# Patient Record
Sex: MALE | Race: WHITE
[De-identification: names, ages, dates, MRNs, and addresses within clinical notes are randomized per-mention and may not be internally consistent; named-entity substitution may affect disease eponyms.]

---

## 2017-10-30 ENCOUNTER — HOSPITAL ENCOUNTER (OUTPATIENT)
Dept: HOSPITAL 33 - MED SURG | Age: 72
Setting detail: OBSERVATION
LOS: 1 days | Discharge: HOME | End: 2017-10-31
Attending: INTERNAL MEDICINE | Admitting: INTERNAL MEDICINE
Payer: MEDICARE

## 2017-10-30 DIAGNOSIS — J41.8: Primary | ICD-10-CM

## 2017-10-30 DIAGNOSIS — J18.9: ICD-10-CM

## 2017-10-30 DIAGNOSIS — Z79.899: ICD-10-CM

## 2017-10-30 DIAGNOSIS — Z23: ICD-10-CM

## 2017-10-30 DIAGNOSIS — Z72.0: ICD-10-CM

## 2017-10-30 LAB
ALBUMIN SERPL-MCNC: 3.7 G/DL (ref 3.4–5)
ALP SERPL-CCNC: 99 U/L (ref 46–116)
ALT SERPL-CCNC: 25 U/L (ref 12–78)
ANION GAP SERPL CALC-SCNC: 12.1 MEQ/L (ref 5–15)
AST SERPL QL: 23 U/L (ref 15–37)
BASOPHILS NFR BLD AUTO: 0.2 % (ref 0–0.4)
BILIRUB BLD-MCNC: 0.3 MG/DL (ref 0.2–1)
BUN SERPL-MCNC: 7 MG/DL (ref 9–20)
CHLORIDE SERPL-SCNC: 90 MEQ/L (ref 98–107)
CO2 SERPL-SCNC: 28.2 MEQ/L (ref 21–32)
GLUCOSE SERPL-MCNC: 120 MG/DL (ref 70–110)
MCH RBC QN AUTO: 29.6 PG (ref 26–32)
NEUTROPHILS NFR BLD AUTO: 69.5 % (ref 36–66)
PHOSPHATE SERPL-SCNC: 3.1 MG/DL (ref 2.6–4.7)
PLATELET # BLD AUTO: 206 K/MM3 (ref 150–450)
POTASSIUM SERPLBLD-SCNC: 4.2 MEQ/L (ref 3.5–5.1)
PROT SERPL-MCNC: 7.3 GM/DL (ref 6.4–8.2)
RBC # BLD AUTO: 4.01 M/MM3 (ref 4.1–5.6)
SODIUM SERPL-SCNC: 126 MEQ/L (ref 136–145)
WBC # BLD AUTO: 5.6 K/MM3 (ref 4–10.5)

## 2017-10-30 PROCEDURE — 36415 COLL VENOUS BLD VENIPUNCTURE: CPT

## 2017-10-30 PROCEDURE — 87040 BLOOD CULTURE FOR BACTERIA: CPT

## 2017-10-30 PROCEDURE — 80053 COMPREHEN METABOLIC PANEL: CPT

## 2017-10-30 PROCEDURE — 94760 N-INVAS EAR/PLS OXIMETRY 1: CPT

## 2017-10-30 PROCEDURE — 87186 SC STD MICRODIL/AGAR DIL: CPT

## 2017-10-30 PROCEDURE — 85025 COMPLETE CBC W/AUTO DIFF WBC: CPT

## 2017-10-30 PROCEDURE — 87077 CULTURE AEROBIC IDENTIFY: CPT

## 2017-10-30 PROCEDURE — 89220 SPUTUM SPECIMEN COLLECTION: CPT

## 2017-10-30 PROCEDURE — 84100 ASSAY OF PHOSPHORUS: CPT

## 2017-10-30 PROCEDURE — 71020: CPT

## 2017-10-30 PROCEDURE — G0008 ADMIN INFLUENZA VIRUS VAC: HCPCS

## 2017-10-30 PROCEDURE — 94640 AIRWAY INHALATION TREATMENT: CPT

## 2017-10-30 PROCEDURE — 87070 CULTURE OTHR SPECIMN AEROBIC: CPT

## 2017-10-30 RX ADMIN — FLUTICASONE PROPIONATE AND SALMETEROL XINAFOATE SCH PUFF: 230; 21 AEROSOL, METERED RESPIRATORY (INHALATION) at 18:38

## 2017-10-30 RX ADMIN — CARBAMAZEPINE SCH MG: 200 TABLET ORAL at 21:38

## 2017-10-30 RX ADMIN — IPRATROPIUM BROMIDE AND ALBUTEROL SULFATE SCH ML: .5; 3 SOLUTION RESPIRATORY (INHALATION) at 22:50

## 2017-10-30 RX ADMIN — ROPINIROLE SCH MG: 0.5 TABLET ORAL at 21:37

## 2017-10-30 RX ADMIN — PRAMIPEXOLE DIHYDROCHLORIDE SCH MG: 0.5 TABLET ORAL at 21:37

## 2017-10-30 RX ADMIN — CEFTRIAXONE SCH MLS/HR: 1 INJECTION, SOLUTION INTRAVENOUS at 15:13

## 2017-10-30 RX ADMIN — IPRATROPIUM BROMIDE AND ALBUTEROL SULFATE SCH ML: .5; 3 SOLUTION RESPIRATORY (INHALATION) at 18:37

## 2017-10-30 RX ADMIN — IPRATROPIUM BROMIDE AND ALBUTEROL SULFATE SCH ML: .5; 3 SOLUTION RESPIRATORY (INHALATION) at 13:47

## 2017-10-30 RX ADMIN — AZITHROMYCIN DIHYDRATE SCH MLS/HR: 500 INJECTION, POWDER, LYOPHILIZED, FOR SOLUTION INTRAVENOUS at 15:21

## 2017-10-30 NOTE — XRAY
Indication: Cough.  Pneumonia.



Comparison: January 5, 2016.



PA/lateral chest remains hyperinflated with now left base discoid

atelectasis/scarring.  No focal infiltrate, consolidation, or large effusion.

Heart and mediastinal structures within normal limits.  Bony thorax intact.



Impression: Nonacute hyperinflated chest.

## 2017-10-31 VITALS — OXYGEN SATURATION: 97 % | SYSTOLIC BLOOD PRESSURE: 110 MMHG | DIASTOLIC BLOOD PRESSURE: 59 MMHG | HEART RATE: 84 BPM

## 2017-10-31 RX ADMIN — IPRATROPIUM BROMIDE AND ALBUTEROL SULFATE SCH ML: .5; 3 SOLUTION RESPIRATORY (INHALATION) at 02:42

## 2017-10-31 RX ADMIN — ROPINIROLE SCH MG: 0.5 TABLET ORAL at 08:39

## 2017-10-31 RX ADMIN — FLUTICASONE PROPIONATE AND SALMETEROL XINAFOATE SCH PUFF: 230; 21 AEROSOL, METERED RESPIRATORY (INHALATION) at 07:09

## 2017-10-31 RX ADMIN — CEFTRIAXONE SCH MLS/HR: 1 INJECTION, SOLUTION INTRAVENOUS at 08:45

## 2017-10-31 RX ADMIN — IPRATROPIUM BROMIDE AND ALBUTEROL SULFATE SCH ML: .5; 3 SOLUTION RESPIRATORY (INHALATION) at 07:08

## 2017-10-31 RX ADMIN — AZITHROMYCIN DIHYDRATE SCH MLS/HR: 500 INJECTION, POWDER, LYOPHILIZED, FOR SOLUTION INTRAVENOUS at 09:28

## 2017-10-31 RX ADMIN — IPRATROPIUM BROMIDE AND ALBUTEROL SULFATE SCH ML: .5; 3 SOLUTION RESPIRATORY (INHALATION) at 11:22

## 2017-10-31 RX ADMIN — PRAMIPEXOLE DIHYDROCHLORIDE SCH MG: 0.5 TABLET ORAL at 08:39

## 2017-10-31 RX ADMIN — CARBAMAZEPINE SCH MG: 200 TABLET ORAL at 08:45

## 2017-10-31 NOTE — PCM.DCORD
- Discharge


Discharge Date: 10/31/17


Disposition: Home, Self-Care


Condition: Stable


Prescriptions: 


New


   Levofloxacin [Levaquin] 500 mg PO DAILY #5 tablet





Continue


   Metoprolol Succinate 25 mg Xl* [Toprol-Xl 25MG Tablets***] 25 mg PO DAILY


   Sertraline HCl [Zoloft] 100 mg PO DAILY


   Lorazepam 1 mg*** [Ativan 1 MG***] 1 mg PO HS


   Pramipexole Di-HCl [Mirapex] 1 mg PO BID


   Ipratropium/Albuterol Sulfate [Combivent Inhaler] 1 puff IH QID


   Famotidine [Pepcid] 40 mg PO DAILY


   Suvorexant [Belsomra] 20 mg PO HS


   Budesonide/Formoterol Fumarate [Symbicort 160-4.5 Mcg Inhaler] 2 puff IH BID


   Cetirizine HCl 10 mg PO DAILY


   Cyanocobalamin 1000 Mcg/ml** [Cyanocobalamin B-12 1000 MCG/ML**] 1 ml IM UD


   Albuterol 2.5 mg/3 ml Neb*** [Proventil 2.5 mg/3 ml Neb***] 3 ml IH Q4H PRN


     PRN Reason: Shortness Of Breath


   Ropinirole HCl 1 mg PO BID


   Carbamazepine [Carbamazepine ER] 400 mg PO BID


Follow up with: 


NANCY HUFF MD [Primary Care Provider] - 1 Week

## 2017-10-31 NOTE — PCM.NOTE
Date and Time: 10/31/17  1254





Subjective Assessment: 





doing ok





- Review of Systems


Constitutional: No Fever, No Chills


Eyes: No Symptoms


Ears, Nose, & Throat: No Symptoms


Respiratory: No Cough, No Short Of Breath


Cardiac: No Chest Pain, No Edema, No Syncope


Abdominal/Gastrointestinal: No Abdominal Pain, No Nausea, No Vomiting, No 

Diarrhea


Genitourinary Symptoms: No Dysuria


Musculoskeletal: No Back Pain, No Neck Pain


Skin: No Rash


Neurological: No Dizziness, No Focal Weakness, No Sensory Changes


Psychological: No Symptoms


Endocrine: No Symptoms


Hematologic/Lymphatic: No Symptoms


Immunological/Allergic: No Symptoms





Objective Exam


General Appearance: no apparent distress, alert


Neurologic Exam: alert, oriented x 3, cooperative, normal mood/affect, nml 

cerebellar function, sensation nml, No motor deficits


Skin Exam: normal color, warm, dry


Eye Exam: PERRL, EOMI, eyes nml inspection


Ears, Nose, Throat Exam: normal ENT inspection, pharynx normal, moist mucous 

membranes


Neck Exam: normal inspection, non-tender, supple, full range of motion


Respiratory Exam: normal breath sounds, lungs clear, No respiratory distress


Cardiovascular Exam: regular rate/rhythm, normal heart sounds


Gastrointestinal/Abdomen Exam: soft, No tenderness, No mass


Extremity Exam: normal inspection, normal range of motion


Back Exam: normal inspection, normal range of motion, No CVA tenderness, No 

vertebral tenderness


Male Genitalia Exam: deferred


Rectal Exam: deferred





OBJECTIVE DATA


Vital Signs: 


 Vital Signs - 24 hr











  Temp Pulse Resp BP Pulse Ox


 


 10/31/17 11:00   73  18   96


 


 10/31/17 08:00    18  


 


 10/31/17 07:16  98.0 F  87  18  133/60  99


 


 10/31/17 07:00   87  18   99


 


 10/31/17 04:00  98.3 F  78  19  137/65 


 


 10/31/17 02:42   90  16   96


 


 10/31/17 00:00    20  


 


 10/30/17 23:00  98.1 F  83  20  140/64  98


 


 10/30/17 22:50   76  15   98


 


 10/30/17 20:00    20  


 


 10/30/17 19:00  97.5 F  75  20  138/63  96


 


 10/30/17 18:37   82  14   97


 


 10/30/17 17:30  98.2 F  70  20  144/68 


 


 10/30/17 16:00    20  


 


 10/30/17 14:16  98.2 F  68  20  155/69  99


 


 10/30/17 13:52   68  20   99


 


 10/30/17 13:13  98.2 F  80  20  155/69  100








 Oxygen-Last 24 hours











O2 Percentage                  2 Liters = 28%


 


O2 Percentage                  2 Liters = 28%


 


O2 Percentage                  2 Liters = 28%


 


O2 Percentage                  2 Liters = 28%


 


O2 Percentage                  2 Liters = 28%


 


O2 Percentage                  2 Liters = 28%











 Pain Assessment - Last Documented











Pain Intensity                 4


 


Pain Scale Used                0-10 Pain Scale











Intake and Output: 


 Intake & Output











 10/29/17 10/30/17 10/31/17 11/01/17





 11:59 11:59 11:59 11:59


 


Intake Total   2300 


 


Output Total   1400 


 


Balance   900 


 


Weight   54.431 kg 











Lab Results: 


 Lab Results-Last 24 Hours











  10/30/17 10/30/17 Range/Units





  14:00 14:00 


 


WBC   5.6  (4.0-10.5)  K/mm3


 


RBC   4.01 L  (4.1-5.6)  M/mm3


 


Hgb   11.9 L  (12.5-18.0)  gm/dl


 


Hct   36.2 L  (42-50)  %


 


MCV   90.3  ()  fl


 


MCH   29.6  (26-32)  pg


 


MCHC   32.9  (32-36)  g/dl


 


RDW   14.6 H  (11.5-14.0)  %


 


Plt Count   206  (150-450)  K/mm3


 


MPV   8.5  (6-9.5)  fl


 


Gran %   69.5 H  (36.0-66.0)  %


 


Lymphocytes %   14.7 L  (24.0-44.0)  %


 


Monocytes %   13.6 H  (0.0-12.0)  %


 


Eosinophils %   2.0  (0.00-5.0)  %


 


Basophils %   0.2  (0.0-0.4)  %


 


Basophils #   0.01  (0-0.4)  


 


Sodium  126 L   (136-145)  mEq/L


 


Potassium  4.2   (3.5-5.1)  mEq/L


 


Chloride  90 L   ()  mEq/L


 


Carbon Dioxide  28.2   (21-32)  mEq/L


 


Anion Gap  12.1   (5-15)  MEQ/L


 


BUN  7 L   (9-20)  mg/dL


 


Creatinine  0.67   (0.55-1.30)  mg/dl


 


Estimated GFR  > 60   ML/MIN


 


Glucose  120 H   ()  MG/DL


 


Calcium  9.2   (8.5-10.1)  mg/dL


 


Phosphorus  3.1   (2.6-4.7)  mg/dL


 


Total Bilirubin  0.30   (0.2-1.0)  mg/dL


 


AST  23   (15-37)  U/L


 


ALT  25   (12-78)  U/L


 


Alkaline Phosphatase  99   ()  U/L


 


Serum Total Protein  7.3   (6.4-8.2)  gm/dL


 


Albumin  3.7   (3.4-5.0)  g/dL











Radiology Exams: 


 Radiology Procedures











 Category Date Time Status


 


 CHEST 2 VIEWS (PA AND LAT) Stat Exams  10/30/17 13:18 Completed











Multi-Disciplinary Progress Notes: 


 Multi-Disciplinary Progress Notes





10/31/17 10:50 Case Management Note by Erica Wilkinson





OBS MESSAGE FROM MEDICARE GIVEN TO PT SIGNED COPY TO PT AND CHART.





Initialized on 10/31/17 10:50 - END OF NOTE

















Assessment/Plan


(1) Chronic bronchitis


Current Visit: Yes   Status: Acute   


Qualifiers: 


   Chronic bronchitis type: mixed simple and mucopurulent   Qualified Code(s): 

J41.8 - Mixed simple and mucopurulent chronic bronchitis   


Code(s): J42 - UNSPECIFIED CHRONIC BRONCHITIS   





(2) Pneumonia


Current Visit: Yes   Status: Acute   


Qualifiers: 


   Pneumonia type: due to unspecified organism   Lung location: unspecified 

part of lung 


Code(s): J18.9 - PNEUMONIA, UNSPECIFIED ORGANISM

## 2017-12-28 ENCOUNTER — HOSPITAL ENCOUNTER (EMERGENCY)
Dept: HOSPITAL 33 - ED | Age: 72
Discharge: HOME | End: 2017-12-28
Payer: MEDICARE

## 2017-12-28 VITALS — SYSTOLIC BLOOD PRESSURE: 148 MMHG | DIASTOLIC BLOOD PRESSURE: 68 MMHG | HEART RATE: 88 BPM

## 2017-12-28 VITALS — OXYGEN SATURATION: 98 %

## 2017-12-28 DIAGNOSIS — Z72.0: ICD-10-CM

## 2017-12-28 DIAGNOSIS — Z79.899: ICD-10-CM

## 2017-12-28 DIAGNOSIS — J44.9: ICD-10-CM

## 2017-12-28 DIAGNOSIS — K21.9: ICD-10-CM

## 2017-12-28 DIAGNOSIS — I10: ICD-10-CM

## 2017-12-28 DIAGNOSIS — J45.909: ICD-10-CM

## 2017-12-28 DIAGNOSIS — R05: Primary | ICD-10-CM

## 2017-12-28 LAB
ALBUMIN SERPL-MCNC: 3.4 G/DL (ref 3.4–5)
ALP SERPL-CCNC: 110 U/L (ref 46–116)
ALT SERPL-CCNC: 28 U/L (ref 12–78)
ANION GAP SERPL CALC-SCNC: 13.1 MEQ/L (ref 5–15)
AST SERPL QL: 25 U/L (ref 15–37)
BASOPHILS # BLD AUTO: 0.01 10*3/UL (ref 0–0.4)
BASOPHILS NFR BLD AUTO: 0.3 % (ref 0–0.4)
BILIRUB BLD-MCNC: 0.3 MG/DL (ref 0.2–1)
BNP SERPL-MCNC: 138 PG/ML (ref 0–125)
BUN SERPL-MCNC: 10 MG/DL (ref 9–20)
CALCIUM SPEC-MCNC: 9.1 MG/DL (ref 8.5–10.1)
CHLORIDE SERPL-SCNC: 97 MEQ/L (ref 98–107)
CO2 SERPL-SCNC: 28.7 MEQ/L (ref 21–32)
CREAT SERPL-MCNC: 0.71 MG/DL (ref 0.55–1.3)
EOSINOPHIL # BLD AUTO: 0.09 10*3/UL (ref 0–0.5)
FLUBV AG SPEC QL IA: NEGATIVE
GFR SERPLBLD BASED ON 1.73 SQ M-ARVRAT: > 60 ML/MIN
GLUCOSE SERPL-MCNC: 109 MG/DL (ref 70–110)
GRANULOCYTES # BLD AUTO: 2.45 10*3/UL (ref 1.4–6.9)
HCT VFR BLD AUTO: 37.8 % (ref 42–50)
HGB BLD-MCNC: 12.2 GM/DL (ref 12.5–18)
INFLUENZA A: NEGATIVE
LYMPHOCYTES # SPEC AUTO: 0.7 10*3/UL (ref 1–4.6)
MCH RBC QN AUTO: 29.5 PG (ref 26–32)
MCHC RBC AUTO-ENTMCNC: 32.3 G/DL (ref 32–36)
MONOCYTES # BLD AUTO: 0.68 10*3/UL (ref 0–1.3)
NEUTROPHILS NFR BLD AUTO: 62.3 % (ref 36–66)
PLATELET # BLD AUTO: 151 K/MM3 (ref 150–450)
POTASSIUM SERPLBLD-SCNC: 4.8 MEQ/L (ref 3.5–5.1)
PROT SERPL-MCNC: 7 GM/DL (ref 6.4–8.2)
RBC # BLD AUTO: 4.13 M/MM3 (ref 4.1–5.6)
SODIUM SERPL-SCNC: 134 MEQ/L (ref 136–145)
WBC # BLD AUTO: 3.9 K/MM3 (ref 4–10.5)

## 2017-12-28 PROCEDURE — 87040 BLOOD CULTURE FOR BACTERIA: CPT

## 2017-12-28 PROCEDURE — 94640 AIRWAY INHALATION TREATMENT: CPT

## 2017-12-28 PROCEDURE — 99284 EMERGENCY DEPT VISIT MOD MDM: CPT

## 2017-12-28 PROCEDURE — 71020: CPT

## 2017-12-28 PROCEDURE — 87070 CULTURE OTHR SPECIMN AEROBIC: CPT

## 2017-12-28 PROCEDURE — 83880 ASSAY OF NATRIURETIC PEPTIDE: CPT

## 2017-12-28 PROCEDURE — 80053 COMPREHEN METABOLIC PANEL: CPT

## 2017-12-28 PROCEDURE — 36415 COLL VENOUS BLD VENIPUNCTURE: CPT

## 2017-12-28 PROCEDURE — 36000 PLACE NEEDLE IN VEIN: CPT

## 2017-12-28 PROCEDURE — 87077 CULTURE AEROBIC IDENTIFY: CPT

## 2017-12-28 PROCEDURE — 85025 COMPLETE CBC W/AUTO DIFF WBC: CPT

## 2017-12-28 PROCEDURE — 87186 SC STD MICRODIL/AGAR DIL: CPT

## 2017-12-28 PROCEDURE — 87400 INFLUENZA A/B EACH AG IA: CPT

## 2017-12-28 PROCEDURE — 96374 THER/PROPH/DIAG INJ IV PUSH: CPT

## 2017-12-28 PROCEDURE — 83605 ASSAY OF LACTIC ACID: CPT

## 2017-12-28 NOTE — XRAY
Indication: Cough.  Short of breath.



Comparison: October 30, 2017.



PA/lateral chest unchanged again hyperinflated with left base

atelectasis/scarring and right lung calcified granulomas.  Remaining heart and

lungs unremarkable.  No new/acute findings.
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
## 2018-10-19 ENCOUNTER — HOSPITAL ENCOUNTER (EMERGENCY)
Dept: HOSPITAL 33 - ED | Age: 73
Discharge: HOME | End: 2018-10-19
Payer: MEDICARE

## 2018-10-19 VITALS — DIASTOLIC BLOOD PRESSURE: 72 MMHG | HEART RATE: 70 BPM | SYSTOLIC BLOOD PRESSURE: 132 MMHG

## 2018-10-19 VITALS — OXYGEN SATURATION: 99 %

## 2018-10-19 DIAGNOSIS — Z85.118: ICD-10-CM

## 2018-10-19 DIAGNOSIS — R11.2: ICD-10-CM

## 2018-10-19 DIAGNOSIS — K52.9: Primary | ICD-10-CM

## 2018-10-19 DIAGNOSIS — R19.7: ICD-10-CM

## 2018-10-19 DIAGNOSIS — J44.9: ICD-10-CM

## 2018-10-19 DIAGNOSIS — E87.1: ICD-10-CM

## 2018-10-19 DIAGNOSIS — Z79.899: ICD-10-CM

## 2018-10-19 LAB
ALBUMIN SERPL-MCNC: 3.8 G/DL (ref 3.5–5)
ALP SERPL-CCNC: 92 U/L (ref 38–126)
ALT SERPL-CCNC: 82 U/L (ref 0–50)
AMYLASE SERPL-CCNC: 59 U/L (ref 30–110)
ANION GAP SERPL CALC-SCNC: 13.2 MEQ/L (ref 5–15)
AST SERPL QL: 99 U/L (ref 17–59)
BASOPHILS # BLD AUTO: 0.01 10*3/UL (ref 0–0.4)
BASOPHILS NFR BLD AUTO: 0.3 % (ref 0–0.4)
BILIRUB BLD-MCNC: 0.5 MG/DL (ref 0.2–1.3)
BUN SERPL-MCNC: 12 MG/DL (ref 9–20)
CALCIUM SPEC-MCNC: 8.7 MG/DL (ref 8.4–10.2)
CHLORIDE SERPL-SCNC: 90 MMOL/L (ref 98–107)
CO2 SERPL-SCNC: 27 MMOL/L (ref 22–30)
CREAT SERPL-MCNC: 0.5 MG/DL (ref 0.66–1.25)
EOSINOPHIL # BLD AUTO: 0.08 10*3/UL (ref 0–0.5)
GLUCOSE SERPL-MCNC: 79 MG/DL (ref 74–106)
GLUCOSE UR-MCNC: NEGATIVE MG/DL
GRANULOCYTES # BLD AUTO: 2.22 10*3/UL (ref 1.4–6.9)
HCT VFR BLD AUTO: 32.4 % (ref 42–50)
HGB BLD-MCNC: 10.4 GM/DL (ref 12.5–18)
LIPASE SERPL-CCNC: 22 U/L (ref 23–300)
LYMPHOCYTES # SPEC AUTO: 0.65 10*3/UL (ref 1–4.6)
MCH RBC QN AUTO: 28.5 PG (ref 26–32)
MCHC RBC AUTO-ENTMCNC: 32.1 G/DL (ref 32–36)
MONOCYTES # BLD AUTO: 0.13 10*3/UL (ref 0–1.3)
NEUTROPHILS NFR BLD AUTO: 71.9 % (ref 36–66)
PLATELET # BLD AUTO: 161 K/MM3 (ref 150–450)
POTASSIUM SERPLBLD-SCNC: 4 MMOL/L (ref 3.5–5.1)
PROT SERPL-MCNC: 6.2 G/DL (ref 6.3–8.2)
PROT UR STRIP-MCNC: NEGATIVE MG/DL
RBC # BLD AUTO: 3.64 M/MM3 (ref 4.1–5.6)
SODIUM SERPL-SCNC: 127 MMOL/L (ref 137–145)
WBC # BLD AUTO: 3.1 K/MM3 (ref 4–10.5)

## 2018-10-19 PROCEDURE — 96361 HYDRATE IV INFUSION ADD-ON: CPT

## 2018-10-19 PROCEDURE — 96374 THER/PROPH/DIAG INJ IV PUSH: CPT

## 2018-10-19 PROCEDURE — 81001 URINALYSIS AUTO W/SCOPE: CPT

## 2018-10-19 PROCEDURE — 36415 COLL VENOUS BLD VENIPUNCTURE: CPT

## 2018-10-19 PROCEDURE — 83605 ASSAY OF LACTIC ACID: CPT

## 2018-10-19 PROCEDURE — 80053 COMPREHEN METABOLIC PANEL: CPT

## 2018-10-19 PROCEDURE — 82150 ASSAY OF AMYLASE: CPT

## 2018-10-19 PROCEDURE — 83690 ASSAY OF LIPASE: CPT

## 2018-10-19 PROCEDURE — 96360 HYDRATION IV INFUSION INIT: CPT

## 2018-10-19 PROCEDURE — 96372 THER/PROPH/DIAG INJ SC/IM: CPT

## 2018-10-19 PROCEDURE — 36000 PLACE NEEDLE IN VEIN: CPT

## 2018-10-19 PROCEDURE — 85025 COMPLETE CBC W/AUTO DIFF WBC: CPT

## 2018-10-19 PROCEDURE — 99284 EMERGENCY DEPT VISIT MOD MDM: CPT
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
## 2018-12-26 ENCOUNTER — HOSPITAL ENCOUNTER (EMERGENCY)
Dept: HOSPITAL 33 - ED | Age: 73
Discharge: HOME | End: 2018-12-26
Payer: MEDICARE

## 2018-12-26 VITALS — OXYGEN SATURATION: 100 %

## 2018-12-26 VITALS — DIASTOLIC BLOOD PRESSURE: 79 MMHG | HEART RATE: 68 BPM | SYSTOLIC BLOOD PRESSURE: 122 MMHG

## 2018-12-26 DIAGNOSIS — C34.90: ICD-10-CM

## 2018-12-26 DIAGNOSIS — R07.9: ICD-10-CM

## 2018-12-26 DIAGNOSIS — R05: Primary | ICD-10-CM

## 2018-12-26 DIAGNOSIS — Z79.899: ICD-10-CM

## 2018-12-26 LAB
ANION GAP SERPL CALC-SCNC: 11.4 MEQ/L (ref 5–15)
BUN SERPL-MCNC: 8 MG/DL (ref 9–20)
CALCIUM SPEC-MCNC: 8.3 MG/DL (ref 8.4–10.2)
CHLORIDE SERPL-SCNC: 95 MMOL/L (ref 98–107)
CO2 SERPL-SCNC: 33 MMOL/L (ref 22–30)
CREAT SERPL-MCNC: 0.51 MG/DL (ref 0.66–1.25)
FLUAV AG NPH QL IA: NEGATIVE
FLUBV AG NPH QL IA: NEGATIVE
GLUCOSE SERPL-MCNC: 98 MG/DL (ref 74–106)
HCT VFR BLD AUTO: 30.3 % (ref 42–50)
HGB BLD-MCNC: 9.3 GM/DL (ref 12.5–18)
MCH RBC QN AUTO: 27.9 PG (ref 26–32)
MCHC RBC AUTO-ENTMCNC: 30.7 G/DL (ref 32–36)
PLATELET # BLD AUTO: 118 K/MM3 (ref 150–450)
POTASSIUM SERPLBLD-SCNC: 4.4 MMOL/L (ref 3.5–5.1)
RBC # BLD AUTO: 3.33 M/MM3 (ref 4.1–5.6)
RSV AG SPEC QL IA: NEGATIVE
SODIUM SERPL-SCNC: 135 MMOL/L (ref 137–145)
WBC # BLD AUTO: 3.5 K/MM3 (ref 4–10.5)

## 2018-12-26 PROCEDURE — 83605 ASSAY OF LACTIC ACID: CPT

## 2018-12-26 PROCEDURE — 96361 HYDRATE IV INFUSION ADD-ON: CPT

## 2018-12-26 PROCEDURE — 80048 BASIC METABOLIC PNL TOTAL CA: CPT

## 2018-12-26 PROCEDURE — 87631 RESP VIRUS 3-5 TARGETS: CPT

## 2018-12-26 PROCEDURE — 99284 EMERGENCY DEPT VISIT MOD MDM: CPT

## 2018-12-26 PROCEDURE — 85025 COMPLETE CBC W/AUTO DIFF WBC: CPT

## 2018-12-26 PROCEDURE — 36000 PLACE NEEDLE IN VEIN: CPT

## 2018-12-26 PROCEDURE — 36415 COLL VENOUS BLD VENIPUNCTURE: CPT

## 2018-12-26 PROCEDURE — 96360 HYDRATION IV INFUSION INIT: CPT

## 2018-12-26 PROCEDURE — 87651 STREP A DNA AMP PROBE: CPT

## 2018-12-26 PROCEDURE — 71045 X-RAY EXAM CHEST 1 VIEW: CPT

## 2018-12-26 PROCEDURE — 87040 BLOOD CULTURE FOR BACTERIA: CPT
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
## 2018-12-26 NOTE — XRAY
Indication: Fever, cough, short of breath.



Comparison: December 28, 2017.



Portable chest demonstrates new right lung postsurgical changes as evidence by

suture material, lung volume loss, apical pleural parenchymal opacity, and

right Port-A-Cath.  New 3 cm right mid to upper lung and new 1 cm left upper

lung nodules.  Stable left base discoid atelectasis/scarring.  Remaining heart

and lungs unremarkable.  Bony thorax intact.



Impression:

1.  New right lung postsurgical changes.

2.  New bilateral nodules as detailed.  CT may yield further information.

3.  Stable left base atelectasis/scarring.

## 2019-11-26 ENCOUNTER — HOSPITAL ENCOUNTER (EMERGENCY)
Dept: HOSPITAL 33 - ED | Age: 74
LOS: 1 days | Discharge: ANOTHER HOSPITAL | End: 2019-11-27
Payer: MEDICARE

## 2019-11-26 DIAGNOSIS — R00.0: ICD-10-CM

## 2019-11-26 DIAGNOSIS — C34.90: ICD-10-CM

## 2019-11-26 DIAGNOSIS — Z79.899: ICD-10-CM

## 2019-11-26 DIAGNOSIS — R06.02: Primary | ICD-10-CM

## 2019-11-26 DIAGNOSIS — E16.2: ICD-10-CM

## 2019-11-26 DIAGNOSIS — R53.83: ICD-10-CM

## 2019-11-26 DIAGNOSIS — R50.9: ICD-10-CM

## 2019-11-26 LAB
ALBUMIN SERPL-MCNC: 3.9 G/DL (ref 3.5–5)
ALP SERPL-CCNC: 76 U/L (ref 38–126)
ALT SERPL-CCNC: 19 U/L (ref 0–50)
ANION GAP SERPL CALC-SCNC: 15.5 MEQ/L (ref 5–15)
AST SERPL QL: 26 U/L (ref 17–59)
BASOPHILS # BLD AUTO: 0.01 10*3/UL (ref 0–0.4)
BASOPHILS NFR BLD AUTO: 0.1 % (ref 0–0.4)
BILIRUB BLD-MCNC: 0.6 MG/DL (ref 0.2–1.3)
BNP SERPL-MCNC: 261 PG/ML (ref 0–900)
BUN SERPL-MCNC: 14 MG/DL (ref 9–20)
CALCIUM SPEC-MCNC: 9.3 MG/DL (ref 8.4–10.2)
CHLORIDE SERPL-SCNC: 91 MMOL/L (ref 98–107)
CO2 SERPL-SCNC: 25 MMOL/L (ref 22–30)
CREAT SERPL-MCNC: 0.54 MG/DL (ref 0.66–1.25)
EOSINOPHIL # BLD AUTO: 0.1 10*3/UL (ref 0–0.5)
GLUCOSE SERPL-MCNC: 120 MG/DL (ref 74–106)
HCT VFR BLD AUTO: 39.5 % (ref 42–50)
HGB BLD-MCNC: 12.6 GM/DL (ref 12.5–18)
LIPASE SERPL-CCNC: 23 U/L (ref 23–300)
LYMPHOCYTES # SPEC AUTO: 0.7 10*3/UL (ref 1–4.6)
MCH RBC QN AUTO: 29.4 PG (ref 26–32)
MCHC RBC AUTO-ENTMCNC: 31.9 G/DL (ref 32–36)
MONOCYTES # BLD AUTO: 1.55 10*3/UL (ref 0–1.3)
PLATELET # BLD AUTO: 127 K/MM3 (ref 150–450)
POTASSIUM SERPLBLD-SCNC: 4.3 MMOL/L (ref 3.5–5.1)
PROT SERPL-MCNC: 7.2 G/DL (ref 6.3–8.2)
RBC # BLD AUTO: 4.28 M/MM3 (ref 4.1–5.6)
SODIUM SERPL-SCNC: 127 MMOL/L (ref 137–145)
WBC # BLD AUTO: 13 K/MM3 (ref 4–10.5)

## 2019-11-26 PROCEDURE — 83690 ASSAY OF LIPASE: CPT

## 2019-11-26 PROCEDURE — 96374 THER/PROPH/DIAG INJ IV PUSH: CPT

## 2019-11-26 PROCEDURE — 71260 CT THORAX DX C+: CPT

## 2019-11-26 PROCEDURE — 87086 URINE CULTURE/COLONY COUNT: CPT

## 2019-11-26 PROCEDURE — 36000 PLACE NEEDLE IN VEIN: CPT

## 2019-11-26 PROCEDURE — 81001 URINALYSIS AUTO W/SCOPE: CPT

## 2019-11-26 PROCEDURE — 83605 ASSAY OF LACTIC ACID: CPT

## 2019-11-26 PROCEDURE — 93005 ELECTROCARDIOGRAM TRACING: CPT

## 2019-11-26 PROCEDURE — 83880 ASSAY OF NATRIURETIC PEPTIDE: CPT

## 2019-11-26 PROCEDURE — 85025 COMPLETE CBC W/AUTO DIFF WBC: CPT

## 2019-11-26 PROCEDURE — 99285 EMERGENCY DEPT VISIT HI MDM: CPT

## 2019-11-26 PROCEDURE — 80048 BASIC METABOLIC PNL TOTAL CA: CPT

## 2019-11-26 PROCEDURE — 96360 HYDRATION IV INFUSION INIT: CPT

## 2019-11-26 PROCEDURE — 80076 HEPATIC FUNCTION PANEL: CPT

## 2019-11-26 PROCEDURE — 87040 BLOOD CULTURE FOR BACTERIA: CPT

## 2019-11-26 PROCEDURE — 36415 COLL VENOUS BLD VENIPUNCTURE: CPT

## 2019-11-26 PROCEDURE — 96361 HYDRATE IV INFUSION ADD-ON: CPT

## 2019-11-26 PROCEDURE — 96365 THER/PROPH/DIAG IV INF INIT: CPT

## 2019-11-26 PROCEDURE — 74177 CT ABD & PELVIS W/CONTRAST: CPT

## 2019-11-26 PROCEDURE — 84484 ASSAY OF TROPONIN QUANT: CPT
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
## 2019-11-27 VITALS — SYSTOLIC BLOOD PRESSURE: 154 MMHG | HEART RATE: 84 BPM | DIASTOLIC BLOOD PRESSURE: 73 MMHG | OXYGEN SATURATION: 99 %

## 2019-11-27 LAB
BLD SMEAR INTERP: YES
GLUCOSE UR-MCNC: NEGATIVE MG/DL
PROT UR STRIP-MCNC: NEGATIVE MG/DL
RBC #/AREA URNS HPF: (no result) /HPF (ref 0–2)
WBC #/AREA URNS HPF: (no result) /HPF (ref 0–5)

## 2019-11-27 NOTE — XRAY
Indication: Nausea, vomiting, dysuria, increased urinary frequency, and short

of breath.  History stage IV lung cancer.



Multiple contiguous axial images obtained through the abdomen and pelvis using

80 cc Isovue 370 contrast.



Comparison: None



CT chest reported separately.



Noncontrasted stomach and bowel loops appear nonobstructed.  Mild fluid

distended small and large bowel loops with synchronous fluid leveling, ileus

versus enterocolitis.  Enlarged prostate gland with heterogeneous enhancement,

probable prostatitis.  No free fluid/air.  Patient reports appendectomy.



The left kidney demonstrates 2 cortical cysts, largest 2.6 cm.  Remaining

liver, gallbladder, pancreas, spleen, adrenal glands, kidneys, ureters, and

bladder appear unremarkable.  Moderate scattered aortoiliac calcifications.

No AAA or pathologic retroperitoneal lymphadenopathy.



Osseous structures demonstrates mild/moderate degenerative changes throughout

the thoracolumbar spine.  Superior right femur head demonstrates serpiginous

sclerotic and radiolucent lesions favoring avascular necrosis.  Proximal right

femur also demonstrates partially visualized bone cyst.



Impression:

1.  Fluid distended small and large bowel loops with fluid leveling, ileus

versus enterocolitis.

2.  Enlarged heterogeneous enhancing prostate gland favoring prostatitis.

3.  Left renal cysts.

4.  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

proximal femur bone cyst.



Comment: Preliminary interpretation was made by [facility].  No critical discrepancy.

## 2019-11-27 NOTE — XRAY
Indication: Nausea, vomiting, dysuria, increased urinary frequency, and short

of breath.  History stage IV lung cancer.



Multiple contiguous axial images obtained through the chest using 80 cc Isovue

370 contrast.



Comparison: August 11, 2010.



Both lung apices not completely included and there is mild respiration

artifact throughout limiting study.  New mild right lung volume loss with

scattered fibrosis/scarring.  Also new patchy areas of consolidating airspace

opacities greatest near the lung base along with new small effusion.  New

multifocal masslike opacities, several appearing cavitary largest 2.2 x 2.0 x

2.8 cm right middle lobe.  Left upper lobe demonstrates new 8 mm noncalcified

nodule.



Heart is not enlarged.  Aorta is mildly arteriosclerotic without

aneurysm/dissection.  No pathologic mediastinal lymphadenopathy.  Bony thorax

intact with mild osteopenia and mild degenerative changes throughout the spine.



CT abdomen/pelvis reported separately.



Impression:

1.  Limited exam due lung apices not completely included and mild respiration

artifact.

2.  New right lung patchy airspace disease and small effusion.

3.  New right lung cavitary masslike opacities and left upper lobe

noncalcified nodule presumed known malignancy.



Comment: Preliminary interpretation was made by [facility].  No critical discrepancy.

## 2020-03-30 ENCOUNTER — HOSPITAL ENCOUNTER (EMERGENCY)
Dept: HOSPITAL 33 - ED | Age: 75
Discharge: HOME | End: 2020-03-30
Payer: MEDICARE

## 2020-03-30 VITALS — OXYGEN SATURATION: 99 %

## 2020-03-30 VITALS — HEART RATE: 80 BPM

## 2020-03-30 VITALS — SYSTOLIC BLOOD PRESSURE: 172 MMHG | DIASTOLIC BLOOD PRESSURE: 82 MMHG

## 2020-03-30 DIAGNOSIS — J20.9: Primary | ICD-10-CM

## 2020-03-30 DIAGNOSIS — Z86.73: ICD-10-CM

## 2020-03-30 DIAGNOSIS — Z99.81: ICD-10-CM

## 2020-03-30 DIAGNOSIS — Z85.118: ICD-10-CM

## 2020-03-30 DIAGNOSIS — J96.10: ICD-10-CM

## 2020-03-30 DIAGNOSIS — Z79.899: ICD-10-CM

## 2020-03-30 DIAGNOSIS — J44.0: ICD-10-CM

## 2020-03-30 DIAGNOSIS — J44.1: ICD-10-CM

## 2020-03-30 LAB
ALBUMIN SERPL-MCNC: 4.2 G/DL (ref 3.5–5)
ALP SERPL-CCNC: 83 U/L (ref 38–126)
ALT SERPL-CCNC: 25 U/L (ref 0–50)
ANION GAP SERPL CALC-SCNC: 12 MEQ/L (ref 5–15)
AST SERPL QL: 32 U/L (ref 17–59)
BASOPHILS # BLD AUTO: 0.01 10*3/UL (ref 0–0.4)
BASOPHILS NFR BLD AUTO: 0.1 % (ref 0–0.4)
BILIRUB BLD-MCNC: 0.3 MG/DL (ref 0.2–1.3)
BUN SERPL-MCNC: 15 MG/DL (ref 9–20)
CALCIUM SPEC-MCNC: 8.9 MG/DL (ref 8.4–10.2)
CHLORIDE SERPL-SCNC: 93 MMOL/L (ref 98–107)
CO2 SERPL-SCNC: 30 MMOL/L (ref 22–30)
CREAT SERPL-MCNC: 0.61 MG/DL (ref 0.66–1.25)
EOSINOPHIL # BLD AUTO: 0.12 10*3/UL (ref 0–0.5)
FLUAV AG NPH QL IA: NEGATIVE
FLUBV AG NPH QL IA: NEGATIVE
GLUCOSE SERPL-MCNC: 85 MG/DL (ref 74–106)
HCT VFR BLD AUTO: 39.5 % (ref 42–50)
HGB BLD-MCNC: 13 GM/DL (ref 12.5–18)
LYMPHOCYTES # SPEC AUTO: 1.45 10*3/UL (ref 1–4.6)
MCH RBC QN AUTO: 31 PG (ref 26–32)
MCHC RBC AUTO-ENTMCNC: 32.9 G/DL (ref 32–36)
MONOCYTES # BLD AUTO: 0.96 10*3/UL (ref 0–1.3)
PLATELET # BLD AUTO: 160 K/MM3 (ref 150–450)
POTASSIUM SERPLBLD-SCNC: 3.9 MMOL/L (ref 3.5–5.1)
PROT SERPL-MCNC: 6.9 G/DL (ref 6.3–8.2)
RBC # BLD AUTO: 4.19 M/MM3 (ref 4.1–5.6)
RSV AG SPEC QL IA: NEGATIVE
SODIUM SERPL-SCNC: 131 MMOL/L (ref 137–145)
WBC # BLD AUTO: 7.9 K/MM3 (ref 4–10.5)

## 2020-03-30 PROCEDURE — U0002 COVID-19 LAB TEST NON-CDC: HCPCS

## 2020-03-30 PROCEDURE — 93005 ELECTROCARDIOGRAM TRACING: CPT

## 2020-03-30 PROCEDURE — 36000 PLACE NEEDLE IN VEIN: CPT

## 2020-03-30 PROCEDURE — 85025 COMPLETE CBC W/AUTO DIFF WBC: CPT

## 2020-03-30 PROCEDURE — 99284 EMERGENCY DEPT VISIT MOD MDM: CPT

## 2020-03-30 PROCEDURE — 87040 BLOOD CULTURE FOR BACTERIA: CPT

## 2020-03-30 PROCEDURE — 36415 COLL VENOUS BLD VENIPUNCTURE: CPT

## 2020-03-30 PROCEDURE — 96374 THER/PROPH/DIAG INJ IV PUSH: CPT

## 2020-03-30 PROCEDURE — 83605 ASSAY OF LACTIC ACID: CPT

## 2020-03-30 PROCEDURE — 80053 COMPREHEN METABOLIC PANEL: CPT

## 2020-03-30 PROCEDURE — 87635 SARS-COV-2 COVID-19 AMP PRB: CPT

## 2020-03-30 PROCEDURE — 87631 RESP VIRUS 3-5 TARGETS: CPT

## 2020-03-30 PROCEDURE — 71045 X-RAY EXAM CHEST 1 VIEW: CPT

## 2020-03-30 PROCEDURE — 84484 ASSAY OF TROPONIN QUANT: CPT
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
## 2020-03-31 NOTE — XRAY
Indication: Cough and short of breath.  COPD.  Lung cancer.



Comparison: December 26, 2018.



Portable chest unchanged again demonstrating right lung postsurgical changes,

left base discoid atelectasis/scarring, biapical indeterminate nodules, and

right Port-A-Cath.  No new/acute cardiopulmonary abnormalities.  Bony thorax

intact.

## 2020-05-07 ENCOUNTER — HOSPITAL ENCOUNTER (OUTPATIENT)
Dept: HOSPITAL 33 - MED SURG | Age: 75
Setting detail: OBSERVATION
LOS: 1 days | Discharge: HOME | End: 2020-05-08
Attending: FAMILY MEDICINE | Admitting: INTERNAL MEDICINE
Payer: MEDICARE

## 2020-05-07 DIAGNOSIS — Z86.73: ICD-10-CM

## 2020-05-07 DIAGNOSIS — J44.0: Primary | ICD-10-CM

## 2020-05-07 DIAGNOSIS — J20.9: ICD-10-CM

## 2020-05-07 DIAGNOSIS — Z85.118: ICD-10-CM

## 2020-05-07 DIAGNOSIS — Z79.899: ICD-10-CM

## 2020-05-07 LAB
ALBUMIN SERPL-MCNC: 4 G/DL (ref 3.5–5)
ALP SERPL-CCNC: 90 U/L (ref 38–126)
ALT SERPL-CCNC: 21 U/L (ref 0–50)
ANION GAP SERPL CALC-SCNC: 12.7 MEQ/L (ref 5–15)
AST SERPL QL: 31 U/L (ref 17–59)
BASOPHILS # BLD AUTO: 0.01 10*3/UL (ref 0–0.4)
BASOPHILS NFR BLD AUTO: 0.1 % (ref 0–0.4)
BILIRUB BLD-MCNC: 0.4 MG/DL (ref 0.2–1.3)
BUN SERPL-MCNC: 13 MG/DL (ref 9–20)
CALCIUM SPEC-MCNC: 8.8 MG/DL (ref 8.4–10.2)
CHLORIDE SERPL-SCNC: 90 MMOL/L (ref 98–107)
CO2 SERPL-SCNC: 30 MMOL/L (ref 22–30)
CREAT SERPL-MCNC: 0.66 MG/DL (ref 0.66–1.25)
EOSINOPHIL # BLD AUTO: 0.2 10*3/UL (ref 0–0.5)
FLUAV AG NPH QL IA: NEGATIVE
FLUBV AG NPH QL IA: NEGATIVE
GLUCOSE SERPL-MCNC: 79 MG/DL (ref 74–106)
HCT VFR BLD AUTO: 39.2 % (ref 42–50)
HGB BLD-MCNC: 12.9 GM/DL (ref 12.5–18)
LYMPHOCYTES # SPEC AUTO: 1.12 10*3/UL (ref 1–4.6)
MCH RBC QN AUTO: 31.1 PG (ref 26–32)
MCHC RBC AUTO-ENTMCNC: 32.9 G/DL (ref 32–36)
MONOCYTES # BLD AUTO: 0.93 10*3/UL (ref 0–1.3)
PLATELET # BLD AUTO: 155 K/MM3 (ref 150–450)
POTASSIUM SERPLBLD-SCNC: 4.4 MMOL/L (ref 3.5–5.1)
PROT SERPL-MCNC: 6.8 G/DL (ref 6.3–8.2)
RBC # BLD AUTO: 4.15 M/MM3 (ref 4.1–5.6)
RSV AG SPEC QL IA: NEGATIVE
SODIUM SERPL-SCNC: 128 MMOL/L (ref 137–145)
WBC # BLD AUTO: 6.8 K/MM3 (ref 4–10.5)

## 2020-05-07 PROCEDURE — U0003 INFECTIOUS AGENT DETECTION BY NUCLEIC ACID (DNA OR RNA); SEVERE ACUTE RESPIRATORY SYNDROME CORONAVIRUS 2 (SARS-COV-2) (CORONAVIRUS DISEASE [COVID-19]), AMPLIFIED PROBE TECHNIQUE, MAKING USE OF HIGH THROUGHPUT TECHNOLOGIES AS DESCRIBED BY CMS-2020-01-R: HCPCS

## 2020-05-07 PROCEDURE — 85025 COMPLETE CBC W/AUTO DIFF WBC: CPT

## 2020-05-07 PROCEDURE — G0378 HOSPITAL OBSERVATION PER HR: HCPCS

## 2020-05-07 PROCEDURE — 94640 AIRWAY INHALATION TREATMENT: CPT

## 2020-05-07 PROCEDURE — 87077 CULTURE AEROBIC IDENTIFY: CPT

## 2020-05-07 PROCEDURE — 36415 COLL VENOUS BLD VENIPUNCTURE: CPT

## 2020-05-07 PROCEDURE — 93268 ECG RECORD/REVIEW: CPT

## 2020-05-07 PROCEDURE — 87070 CULTURE OTHR SPECIMN AEROBIC: CPT

## 2020-05-07 PROCEDURE — 87631 RESP VIRUS 3-5 TARGETS: CPT

## 2020-05-07 PROCEDURE — 94762 N-INVAS EAR/PLS OXIMTRY CONT: CPT

## 2020-05-07 PROCEDURE — 71045 X-RAY EXAM CHEST 1 VIEW: CPT

## 2020-05-07 PROCEDURE — 87186 SC STD MICRODIL/AGAR DIL: CPT

## 2020-05-07 PROCEDURE — 80053 COMPREHEN METABOLIC PANEL: CPT

## 2020-05-07 RX ADMIN — ALBUTEROL SULFATE SCH PUFF: 90 AEROSOL, METERED RESPIRATORY (INHALATION) at 19:35

## 2020-05-07 RX ADMIN — WATER SCH MG: 1 INJECTION INTRAMUSCULAR; INTRAVENOUS; SUBCUTANEOUS at 23:59

## 2020-05-07 RX ADMIN — AZITHROMYCIN DIHYDRATE SCH MLS/HR: 500 INJECTION, POWDER, LYOPHILIZED, FOR SOLUTION INTRAVENOUS at 17:40

## 2020-05-07 RX ADMIN — PRAMIPEXOLE DIHYDROCHLORIDE SCH MG: 0.5 TABLET ORAL at 21:28

## 2020-05-07 RX ADMIN — ALBUTEROL SULFATE SCH PUFF: 90 AEROSOL, METERED RESPIRATORY (INHALATION) at 15:35

## 2020-05-07 RX ADMIN — ALBUTEROL SULFATE SCH PUFF: 90 AEROSOL, METERED RESPIRATORY (INHALATION) at 22:50

## 2020-05-07 RX ADMIN — MAGNESIUM OXIDE TAB 400 MG (241.3 MG ELEMENTAL MG) SCH MG: 400 (241.3 MG) TAB at 21:28

## 2020-05-07 RX ADMIN — CEFTRIAXONE SCH MLS/HR: 1 INJECTION, SOLUTION INTRAVENOUS at 17:40

## 2020-05-07 RX ADMIN — WATER SCH MG: 1 INJECTION INTRAMUSCULAR; INTRAVENOUS; SUBCUTANEOUS at 17:40

## 2020-05-07 NOTE — XRAY
Indication: Pneumonia.  History lung cancer and COPD.  Suspect COVID 19.



Portable chest unchanged again demonstrating right lung postsurgical changes,

left base fibrosis/scarring, biapical indeterminant micronodules, and right

Port-A-Cath.  No interval developing focal infiltrate, consolidation, or large

effusion.  Heart is not enlarged.  Bony thorax intact again with mild

degenerative changes and left shoulder surgery.



Impression: Continued nonacute chest with chronic features.

## 2020-05-08 VITALS — HEART RATE: 78 BPM | SYSTOLIC BLOOD PRESSURE: 140 MMHG | DIASTOLIC BLOOD PRESSURE: 67 MMHG

## 2020-05-08 VITALS — OXYGEN SATURATION: 99 %

## 2020-05-08 RX ADMIN — AZITHROMYCIN DIHYDRATE SCH MLS/HR: 500 INJECTION, POWDER, LYOPHILIZED, FOR SOLUTION INTRAVENOUS at 10:01

## 2020-05-08 RX ADMIN — WATER SCH MG: 1 INJECTION INTRAMUSCULAR; INTRAVENOUS; SUBCUTANEOUS at 09:12

## 2020-05-08 RX ADMIN — PRAMIPEXOLE DIHYDROCHLORIDE SCH MG: 0.5 TABLET ORAL at 09:12

## 2020-05-08 RX ADMIN — ALBUTEROL SULFATE SCH PUFF: 90 AEROSOL, METERED RESPIRATORY (INHALATION) at 01:55

## 2020-05-08 RX ADMIN — ALBUTEROL SULFATE SCH PUFF: 90 AEROSOL, METERED RESPIRATORY (INHALATION) at 07:00

## 2020-05-08 RX ADMIN — MAGNESIUM OXIDE TAB 400 MG (241.3 MG ELEMENTAL MG) SCH MG: 400 (241.3 MG) TAB at 05:27

## 2020-05-08 RX ADMIN — ALBUTEROL SULFATE SCH PUFF: 90 AEROSOL, METERED RESPIRATORY (INHALATION) at 10:45

## 2020-05-08 RX ADMIN — CEFTRIAXONE SCH MLS/HR: 1 INJECTION, SOLUTION INTRAVENOUS at 09:12
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
## 2020-05-18 ENCOUNTER — HOSPITAL ENCOUNTER (OUTPATIENT)
Dept: HOSPITAL 33 - ED | Age: 75
Setting detail: OBSERVATION
LOS: 2 days | Discharge: HOME | End: 2020-05-20
Attending: FAMILY MEDICINE | Admitting: FAMILY MEDICINE
Payer: MEDICARE

## 2020-05-18 DIAGNOSIS — Z86.73: ICD-10-CM

## 2020-05-18 DIAGNOSIS — C34.90: ICD-10-CM

## 2020-05-18 DIAGNOSIS — F41.9: ICD-10-CM

## 2020-05-18 DIAGNOSIS — Z79.899: ICD-10-CM

## 2020-05-18 DIAGNOSIS — E87.1: ICD-10-CM

## 2020-05-18 DIAGNOSIS — R79.89: ICD-10-CM

## 2020-05-18 DIAGNOSIS — J44.1: Primary | ICD-10-CM

## 2020-05-18 DIAGNOSIS — F17.200: ICD-10-CM

## 2020-05-18 DIAGNOSIS — Z99.81: ICD-10-CM

## 2020-05-18 LAB
ALBUMIN SERPL-MCNC: 4.1 G/DL (ref 3.5–5)
ALP SERPL-CCNC: 106 U/L (ref 38–126)
ALT SERPL-CCNC: 22 U/L (ref 0–50)
ANION GAP SERPL CALC-SCNC: 13.4 MEQ/L (ref 5–15)
AST SERPL QL: 32 U/L (ref 17–59)
BASOPHILS # BLD AUTO: 0.03 10*3/UL (ref 0–0.4)
BASOPHILS NFR BLD AUTO: 0.5 % (ref 0–0.4)
BILIRUB BLD-MCNC: 0.6 MG/DL (ref 0.2–1.3)
BNP SERPL-MCNC: 95.8 PG/ML (ref 0–900)
BUN SERPL-MCNC: 10 MG/DL (ref 9–20)
CALCIUM SPEC-MCNC: 9.1 MG/DL (ref 8.4–10.2)
CHLORIDE SERPL-SCNC: 91 MMOL/L (ref 98–107)
CO2 SERPL-SCNC: 31 MMOL/L (ref 22–30)
CREAT SERPL-MCNC: 0.6 MG/DL (ref 0.66–1.25)
EOSINOPHIL # BLD AUTO: 0.46 10*3/UL (ref 0–0.5)
FLUAV AG NPH QL IA: NEGATIVE
FLUBV AG NPH QL IA: NEGATIVE
GLUCOSE SERPL-MCNC: 94 MG/DL (ref 74–106)
GLUCOSE UR-MCNC: NEGATIVE MG/DL
HCT VFR BLD AUTO: 43.2 % (ref 42–50)
HGB BLD-MCNC: 14.1 GM/DL (ref 12.5–18)
LYMPHOCYTES # SPEC AUTO: 0.88 10*3/UL (ref 1–4.6)
MAGNESIUM SERPL-MCNC: 2.1 MG/DL (ref 1.6–2.3)
MCH RBC QN AUTO: 31.1 PG (ref 26–32)
MCHC RBC AUTO-ENTMCNC: 32.6 G/DL (ref 32–36)
MONOCYTES # BLD AUTO: 0.94 10*3/UL (ref 0–1.3)
PLATELET # BLD AUTO: 137 K/MM3 (ref 150–450)
POTASSIUM SERPLBLD-SCNC: 4.7 MMOL/L (ref 3.5–5.1)
PROT SERPL-MCNC: 7.2 G/DL (ref 6.3–8.2)
PROT UR STRIP-MCNC: NEGATIVE MG/DL
RBC # BLD AUTO: 4.53 M/MM3 (ref 4.1–5.6)
RBC #/AREA URNS HPF: (no result) /HPF (ref 0–2)
RSV AG SPEC QL IA: NEGATIVE
SODIUM SERPL-SCNC: 130 MMOL/L (ref 137–145)
WBC # BLD AUTO: 6.3 K/MM3 (ref 4–10.5)

## 2020-05-18 PROCEDURE — 81001 URINALYSIS AUTO W/SCOPE: CPT

## 2020-05-18 PROCEDURE — 83735 ASSAY OF MAGNESIUM: CPT

## 2020-05-18 PROCEDURE — 93268 ECG RECORD/REVIEW: CPT

## 2020-05-18 PROCEDURE — 93041 RHYTHM ECG TRACING: CPT

## 2020-05-18 PROCEDURE — 94762 N-INVAS EAR/PLS OXIMTRY CONT: CPT

## 2020-05-18 PROCEDURE — 82962 GLUCOSE BLOOD TEST: CPT

## 2020-05-18 PROCEDURE — 99285 EMERGENCY DEPT VISIT HI MDM: CPT

## 2020-05-18 PROCEDURE — 85025 COMPLETE CBC W/AUTO DIFF WBC: CPT

## 2020-05-18 PROCEDURE — 84484 ASSAY OF TROPONIN QUANT: CPT

## 2020-05-18 PROCEDURE — 94760 N-INVAS EAR/PLS OXIMETRY 1: CPT

## 2020-05-18 PROCEDURE — 80053 COMPREHEN METABOLIC PANEL: CPT

## 2020-05-18 PROCEDURE — 83880 ASSAY OF NATRIURETIC PEPTIDE: CPT

## 2020-05-18 PROCEDURE — 71045 X-RAY EXAM CHEST 1 VIEW: CPT

## 2020-05-18 PROCEDURE — 93005 ELECTROCARDIOGRAM TRACING: CPT

## 2020-05-18 PROCEDURE — 83605 ASSAY OF LACTIC ACID: CPT

## 2020-05-18 PROCEDURE — 71260 CT THORAX DX C+: CPT

## 2020-05-18 PROCEDURE — 85027 COMPLETE CBC AUTOMATED: CPT

## 2020-05-18 PROCEDURE — 36415 COLL VENOUS BLD VENIPUNCTURE: CPT

## 2020-05-18 PROCEDURE — 85379 FIBRIN DEGRADATION QUANT: CPT

## 2020-05-18 PROCEDURE — G0378 HOSPITAL OBSERVATION PER HR: HCPCS

## 2020-05-18 PROCEDURE — 87040 BLOOD CULTURE FOR BACTERIA: CPT

## 2020-05-18 PROCEDURE — 87631 RESP VIRUS 3-5 TARGETS: CPT

## 2020-05-18 PROCEDURE — 94640 AIRWAY INHALATION TREATMENT: CPT

## 2020-05-18 RX ADMIN — ALBUTEROL SULFATE PRN MG: 2.5 SOLUTION RESPIRATORY (INHALATION) at 20:00

## 2020-05-18 RX ADMIN — SERTRALINE SCH MG: 50 TABLET, FILM COATED ORAL at 20:10

## 2020-05-18 RX ADMIN — FERROUS SULFATE TAB 325 MG (65 MG ELEMENTAL FE) SCH MG: 325 (65 FE) TAB at 20:09

## 2020-05-18 RX ADMIN — ALBUTEROL SULFATE SCH PUFF: 90 AEROSOL, METERED RESPIRATORY (INHALATION) at 19:38

## 2020-05-18 RX ADMIN — ALBUTEROL SULFATE SCH PUFF: 90 AEROSOL, METERED RESPIRATORY (INHALATION) at 15:45

## 2020-05-18 RX ADMIN — PRAMIPEXOLE DIHYDROCHLORIDE SCH MG: 0.5 TABLET ORAL at 20:09

## 2020-05-18 RX ADMIN — MAGNESIUM OXIDE TAB 400 MG (241.3 MG ELEMENTAL MG) SCH MG: 400 (241.3 MG) TAB at 20:09

## 2020-05-18 RX ADMIN — HYDROCODONE BITARTRATE AND ACETAMINOPHEN PRN TAB: 5; 325 TABLET ORAL at 23:36

## 2020-05-18 NOTE — XRAY
Indication: Short of breath.  Elevated d-dimer.  History lung cancer.



Multiple contiguous axial images obtained through the chest using 100 cc of

Isovue-370 contrast and PE protocol.



Comparison: Conventional CT chest with contrast November 26, 2019.



There is good opacification of the pulmonary arteries to include the lobar and

segmental branches.  No filling defect/pulmonary embolus.  Heart is not

enlarged.  Stable right Port-A-Cath.  Aorta remains mildly arteriosclerotic

without aneurysm/dissection.  No pathologic mediastinal/hilar lymphadenopathy.



Stable right upper lung postsurgical changes and fibrosis/scarring.  Right

upper lung again demonstrates multiple noncalcified nodules stable in

size/number with a few appearing more solid in appearance.  Stable

subcentimeter left upper lobe nodule.  No new pulmonary mass/nodule.  Stable

tiny right base effusion and new left base dependent atelectasis.  No effusion.



Bony thorax intact again with mild degenerative changes throughout the spine.



Limited upper abdomen demonstrates mild fatty liver and 2 cm left renal cyst

similar in appearance to CT abdomen/pelvis November 26, 2019.



Impression:

1.  Negative pulmonary embolus.  Stable tiny nonspecific right effusion.  No

new/acute cardiopulmonary abnormalities.

2.  Again indeterminant bilateral upper lung noncalcified nodules stable in

size/number but more solid in appearance.  PET/CT may yield further

information if not already performed elsewhere.

3.  Stable mild fatty liver and left renal cyst.

## 2020-05-18 NOTE — XRAY
Indication: Short of breath.  History lung cancer.  Suspect COVID 19.



Comparison: May 7, 2020.



Portable chest unchanged again demonstrating right lung postsurgical changes,

left base fibrosis/scarring, biapical indeterminate micronodules, and right

Port-A-Cath.  Remaining heart, lungs, and bony thorax unremarkable again with

left shoulder surgery.  No new/acute findings.

## 2020-05-18 NOTE — PCM.BN
Brief Admission Note





- Admission Note


Brief Admisson Note: 


Patient admitted @ 05/18/20 from ER COPD exacerbation and Hx lung cancer.. 

Medication List reviewed and reconciled.
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
## 2020-05-19 LAB
ALBUMIN SERPL-MCNC: 3.6 G/DL (ref 3.5–5)
ALP SERPL-CCNC: 85 U/L (ref 38–126)
ALT SERPL-CCNC: 19 U/L (ref 0–50)
ANION GAP SERPL CALC-SCNC: 10.6 MEQ/L (ref 5–15)
AST SERPL QL: 28 U/L (ref 17–59)
BILIRUB BLD-MCNC: 0.4 MG/DL (ref 0.2–1.3)
BUN SERPL-MCNC: 11 MG/DL (ref 9–20)
CALCIUM SPEC-MCNC: 8.7 MG/DL (ref 8.4–10.2)
CHLORIDE SERPL-SCNC: 94 MMOL/L (ref 98–107)
CO2 SERPL-SCNC: 32 MMOL/L (ref 22–30)
CREAT SERPL-MCNC: 0.61 MG/DL (ref 0.66–1.25)
GLUCOSE SERPL-MCNC: 149 MG/DL (ref 74–106)
HCT VFR BLD AUTO: 39.4 % (ref 42–50)
HGB BLD-MCNC: 12.6 GM/DL (ref 12.5–18)
MCH RBC QN AUTO: 30.9 PG (ref 26–32)
MCHC RBC AUTO-ENTMCNC: 32 G/DL (ref 32–36)
PLATELET # BLD AUTO: 145 K/MM3 (ref 150–450)
POTASSIUM SERPLBLD-SCNC: 4.2 MMOL/L (ref 3.5–5.1)
PROT SERPL-MCNC: 6.2 G/DL (ref 6.3–8.2)
RBC # BLD AUTO: 4.08 M/MM3 (ref 4.1–5.6)
SODIUM SERPL-SCNC: 132 MMOL/L (ref 137–145)
WBC # BLD AUTO: 4.4 K/MM3 (ref 4–10.5)

## 2020-05-19 RX ADMIN — HYDROMORPHONE HYDROCHLORIDE PRN MG: 2 INJECTION, SOLUTION INTRAMUSCULAR; INTRAVENOUS; SUBCUTANEOUS at 23:21

## 2020-05-19 RX ADMIN — ALBUTEROL SULFATE SCH PUFF: 90 AEROSOL, METERED RESPIRATORY (INHALATION) at 06:52

## 2020-05-19 RX ADMIN — LORATADINE SCH MG: 10 TABLET ORAL at 09:30

## 2020-05-19 RX ADMIN — IPRATROPIUM BROMIDE AND ALBUTEROL SULFATE SCH ML: .5; 3 SOLUTION RESPIRATORY (INHALATION) at 14:48

## 2020-05-19 RX ADMIN — HYDROCODONE BITARTRATE AND ACETAMINOPHEN PRN TAB: 5; 325 TABLET ORAL at 05:52

## 2020-05-19 RX ADMIN — HYDROCODONE BITARTRATE AND ACETAMINOPHEN PRN TAB: 5; 325 TABLET ORAL at 11:07

## 2020-05-19 RX ADMIN — WATER SCH MG: 1 INJECTION INTRAMUSCULAR; INTRAVENOUS; SUBCUTANEOUS at 16:44

## 2020-05-19 RX ADMIN — IPRATROPIUM BROMIDE AND ALBUTEROL SULFATE SCH ML: .5; 3 SOLUTION RESPIRATORY (INHALATION) at 19:20

## 2020-05-19 RX ADMIN — ALBUTEROL SULFATE PRN MG: 2.5 SOLUTION RESPIRATORY (INHALATION) at 08:40

## 2020-05-19 RX ADMIN — IPRATROPIUM BROMIDE AND ALBUTEROL SULFATE SCH ML: .5; 3 SOLUTION RESPIRATORY (INHALATION) at 23:29

## 2020-05-19 RX ADMIN — PRAMIPEXOLE DIHYDROCHLORIDE SCH MG: 0.5 TABLET ORAL at 09:30

## 2020-05-19 RX ADMIN — MAGNESIUM OXIDE TAB 400 MG (241.3 MG ELEMENTAL MG) SCH MG: 400 (241.3 MG) TAB at 13:55

## 2020-05-19 RX ADMIN — FERROUS SULFATE TAB 325 MG (65 MG ELEMENTAL FE) SCH MG: 325 (65 FE) TAB at 09:30

## 2020-05-19 RX ADMIN — MAGNESIUM OXIDE TAB 400 MG (241.3 MG ELEMENTAL MG) SCH MG: 400 (241.3 MG) TAB at 20:32

## 2020-05-19 RX ADMIN — ACETYLCYSTEINE SCH MG: 200 INHALANT RESPIRATORY (INHALATION) at 19:21

## 2020-05-19 RX ADMIN — PRAMIPEXOLE DIHYDROCHLORIDE SCH MG: 0.5 TABLET ORAL at 20:32

## 2020-05-19 RX ADMIN — ALBUTEROL SULFATE SCH PUFF: 90 AEROSOL, METERED RESPIRATORY (INHALATION) at 03:43

## 2020-05-19 RX ADMIN — ALBUTEROL SULFATE SCH PUFF: 90 AEROSOL, METERED RESPIRATORY (INHALATION) at 00:33

## 2020-05-19 RX ADMIN — SERTRALINE SCH MG: 50 TABLET, FILM COATED ORAL at 20:33

## 2020-05-19 RX ADMIN — HEPARIN SODIUM (PORCINE) LOCK FLUSH IV SOLN 100 UNIT/ML PRN UNITS: 100 SOLUTION at 16:44

## 2020-05-19 RX ADMIN — ACETYLCYSTEINE SCH MG: 200 INHALANT RESPIRATORY (INHALATION) at 14:49

## 2020-05-19 RX ADMIN — FERROUS SULFATE TAB 325 MG (65 MG ELEMENTAL FE) SCH MG: 325 (65 FE) TAB at 20:32

## 2020-05-19 RX ADMIN — WATER SCH MG: 1 INJECTION INTRAMUSCULAR; INTRAVENOUS; SUBCUTANEOUS at 09:30

## 2020-05-19 RX ADMIN — HYDROMORPHONE HYDROCHLORIDE PRN MG: 2 INJECTION, SOLUTION INTRAMUSCULAR; INTRAVENOUS; SUBCUTANEOUS at 17:04

## 2020-05-19 RX ADMIN — MAGNESIUM OXIDE TAB 400 MG (241.3 MG ELEMENTAL MG) SCH MG: 400 (241.3 MG) TAB at 05:53
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
## 2020-05-20 VITALS — OXYGEN SATURATION: 99 % | HEART RATE: 86 BPM

## 2020-05-20 VITALS — SYSTOLIC BLOOD PRESSURE: 145 MMHG | DIASTOLIC BLOOD PRESSURE: 78 MMHG

## 2020-05-20 LAB
ALBUMIN SERPL-MCNC: 3.8 G/DL (ref 3.5–5)
ALP SERPL-CCNC: 85 U/L (ref 38–126)
ALT SERPL-CCNC: 20 U/L (ref 0–50)
ANION GAP SERPL CALC-SCNC: 12.5 MEQ/L (ref 5–15)
AST SERPL QL: 27 U/L (ref 17–59)
BASOPHILS # BLD AUTO: 0.01 10*3/UL (ref 0–0.4)
BASOPHILS NFR BLD AUTO: 0.2 % (ref 0–0.4)
BILIRUB BLD-MCNC: 0.3 MG/DL (ref 0.2–1.3)
BLD SMEAR INTERP: YES
BUN SERPL-MCNC: 16 MG/DL (ref 9–20)
CALCIUM SPEC-MCNC: 8.7 MG/DL (ref 8.4–10.2)
CHLORIDE SERPL-SCNC: 93 MMOL/L (ref 98–107)
CO2 SERPL-SCNC: 30 MMOL/L (ref 22–30)
CREAT SERPL-MCNC: 0.53 MG/DL (ref 0.66–1.25)
EOSINOPHIL # BLD AUTO: 0.01 10*3/UL (ref 0–0.5)
GLUCOSE SERPL-MCNC: 156 MG/DL (ref 74–106)
HCT VFR BLD AUTO: 39.6 % (ref 42–50)
HGB BLD-MCNC: 12.8 GM/DL (ref 12.5–18)
LYMPHOCYTES # SPEC AUTO: 0.57 10*3/UL (ref 1–4.6)
MCH RBC QN AUTO: 31.3 PG (ref 26–32)
MCHC RBC AUTO-ENTMCNC: 32.3 G/DL (ref 32–36)
MONOCYTES # BLD AUTO: 0.22 10*3/UL (ref 0–1.3)
PLATELET # BLD AUTO: 145 K/MM3 (ref 150–450)
POTASSIUM SERPLBLD-SCNC: 4.5 MMOL/L (ref 3.5–5.1)
PROT SERPL-MCNC: 6.7 G/DL (ref 6.3–8.2)
RBC # BLD AUTO: 4.09 M/MM3 (ref 4.1–5.6)
SODIUM SERPL-SCNC: 132 MMOL/L (ref 137–145)
WBC # BLD AUTO: 6.5 K/MM3 (ref 4–10.5)

## 2020-05-20 RX ADMIN — PRAMIPEXOLE DIHYDROCHLORIDE SCH MG: 0.5 TABLET ORAL at 09:22

## 2020-05-20 RX ADMIN — LORATADINE SCH MG: 10 TABLET ORAL at 09:22

## 2020-05-20 RX ADMIN — WATER SCH MG: 1 INJECTION INTRAMUSCULAR; INTRAVENOUS; SUBCUTANEOUS at 08:02

## 2020-05-20 RX ADMIN — ACETYLCYSTEINE SCH MG: 200 INHALANT RESPIRATORY (INHALATION) at 06:44

## 2020-05-20 RX ADMIN — HYDROMORPHONE HYDROCHLORIDE PRN MG: 2 INJECTION, SOLUTION INTRAMUSCULAR; INTRAVENOUS; SUBCUTANEOUS at 09:57

## 2020-05-20 RX ADMIN — HYDROMORPHONE HYDROCHLORIDE PRN MG: 2 INJECTION, SOLUTION INTRAMUSCULAR; INTRAVENOUS; SUBCUTANEOUS at 05:06

## 2020-05-20 RX ADMIN — WATER SCH MG: 1 INJECTION INTRAMUSCULAR; INTRAVENOUS; SUBCUTANEOUS at 00:32

## 2020-05-20 RX ADMIN — FERROUS SULFATE TAB 325 MG (65 MG ELEMENTAL FE) SCH MG: 325 (65 FE) TAB at 09:22

## 2020-05-20 RX ADMIN — MAGNESIUM OXIDE TAB 400 MG (241.3 MG ELEMENTAL MG) SCH MG: 400 (241.3 MG) TAB at 05:07

## 2020-05-20 RX ADMIN — IPRATROPIUM BROMIDE AND ALBUTEROL SULFATE SCH ML: .5; 3 SOLUTION RESPIRATORY (INHALATION) at 06:44

## 2020-05-20 RX ADMIN — HEPARIN SODIUM (PORCINE) LOCK FLUSH IV SOLN 100 UNIT/ML PRN UNITS: 100 SOLUTION at 12:39

## 2020-05-20 RX ADMIN — HYDROCODONE BITARTRATE AND ACETAMINOPHEN PRN TAB: 5; 325 TABLET ORAL at 08:07

## 2020-05-20 RX ADMIN — IPRATROPIUM BROMIDE AND ALBUTEROL SULFATE SCH ML: .5; 3 SOLUTION RESPIRATORY (INHALATION) at 12:59

## 2020-05-20 NOTE — PCM.DS
Discharge Summary


Date of Admission: 


05/18/20 15:17





Admitting Physician: 


SALENA TALAMANTES DO





Consults: 





 Consults on Case





05/19/20 08:54


Consult Pulmonology ROUTINE 











Primary Care Provider: 


DARIANANCY








Allergies


Allergies





adhesive tape Allergy (Mild, Verified 05/18/20 09:52)


 Rash











Hospital Summary





- Hospital Course


Hospital Course: 





Patient was admitted thru ER with elevated Ddimer, COPD exacerbation and recent 

pneumonia. He has lung cancer and oxygen dependent COPD , on 4L O2 at home. He 

is a current smoker. CXR and Chest CT neg for PE ,no acute changes ,shows 

pulmonary nodules as previous.Patient became anxious when he felt he could not 

breath and was given Ativan and also Dilaudid. IV hydration helped thin mucus 

along with mucomyst/duoneb/albuterol neb treatments  and IV solumedrol.


Patient had been on IV antibiotics up to admission and was afebrile without 

elevated WBC and no IV antibiotics were given.


Patient improved to baseline and was discharged to follow up with his 

Pulmonologist and Oncologist and PCP.





- Vitals & Intake/Output


Vital Signs: 





 Vital Signs











Temperature  97.4 F   05/20/20 08:00


 


Pulse Rate  76   05/20/20 08:00


 


Respiratory Rate  15   05/20/20 08:00


 


Blood Pressure  132/65   05/20/20 08:00


 


O2 Sat by Pulse Oximetry  92 L  05/20/20 08:00











Intake & Output: 





 Intake & Output











 05/17/20 05/18/20 05/19/20 05/20/20





 11:59 11:59 11:59 11:59


 


Intake Total   1250 2097


 


Output Total    2300


 


Balance   1250 -203


 


Weight  70.307 kg 65.6 kg 65.4 kg














- Lab


Result Diagrams: 


 05/20/20 05:25





 05/20/20 05:25


Lab Results-Last 24 Hrs: 





 Accuchecks











Date                           05/19/20


 


Date                           05/19/20


 


Time                           16:47


 


Time                           11:30


 


Accucheck Value:               150


 


Accucheck Value:               122











 Lab Results-Last 24 Hours











  05/20/20 05/20/20 Range/Units





  05:25 05:25 


 


WBC  6.5   (4.0-10.5)  K/mm3


 


RBC  4.09 L   (4.1-5.6)  M/mm3


 


Hgb  12.8   (12.5-18.0)  gm/dl


 


Hct  39.6 L   (42-50)  %


 


MCV  96.8   ()  fl


 


MCH  31.3   (26-32)  pg


 


MCHC  32.3   (32-36)  g/dl


 


RDW  13.5   (11.5-14.0)  %


 


Plt Count  145 L   (150-450)  K/mm3


 


MPV  9.1   (7.5-11.0)  fl


 


Gran %  87.4 H   (36.0-66.0)  %


 


Eos # (Auto)  0.01   (0-0.5)  


 


Absolute Lymphs (auto)  0.57 L   (1.0-4.6)  


 


Absolute Monos (auto)  0.22   (0.0-1.3)  


 


Lymphocytes %  8.8 L   (24.0-44.0)  %


 


Monocytes %  3.4   (0.0-12.0)  %


 


Eosinophils %  0.2   (0.00-5.0)  %


 


Basophils %  0.2   (0.0-0.4)  %


 


Absolute Granulocytes  5.65   (1.4-6.9)  


 


Basophils #  0.01   (0-0.4)  


 


Sodium   132 L  (137-145)  mmol/L


 


Potassium   4.5  (3.5-5.1)  mmol/L


 


Chloride   93 L  ()  mmol/L


 


Carbon Dioxide   30  (22-30)  mmol/L


 


Anion Gap   12.5  (5-15)  MEQ/L


 


BUN   16  (9-20)  mg/dL


 


Creatinine   0.53 L  (0.66-1.25)  mg/dL


 


Estimated GFR   > 60.0  ML/MIN


 


Glucose   156 H  ()  mg/dL


 


Calcium   8.7  (8.4-10.2)  mg/dL


 


Total Bilirubin   0.30  (0.2-1.3)  mg/dL


 


AST   27  (17-59)  U/L


 


ALT   20  (0-50)  U/L


 


Alkaline Phosphatase   85  ()  U/L


 


Serum Total Protein   6.7  (6.3-8.2)  g/dL


 


Albumin   3.8  (3.5-5.0)  g/dL


 


Slides for Path Review  YES   











Micro Results-Entire Visit: 





 Microbiology











 05/18/20 10:36 Blood Culture - Preliminary





 Blood    NO GROWTH TO DATE


 


 05/18/20 10:20 Blood Culture - Preliminary





 Blood    NO GROWTH TO DATE








 Accuchecks











Date                           05/19/20


 


Date                           05/19/20


 


Time                           16:47


 


Time                           11:30


 


Accucheck Value:               150


 


Accucheck Value:               122

















- Radiology Exams


Ordered Rad Exams-Entire Visit: 





 Radiology Procedures











 Category Date Time Status


 


 CHEST WITH CONTRAST [CT] Stat Exams  05/18/20 13:49 Completed














- Procedures and Test


Procedures and Tests throughout Hospitalization: 





 Therapy Orders & Screens





05/18/20 12:12


Respiratory Therapy Assessment DAILY 


   Comment: 





05/18/20 14:36


Respiratory MDI STAT 


   Comment: 





05/18/20 15:55


Oxygen NASAL CANNULA 4 lpm 


   Comment: 


   Diagnosis: COPD





05/18/20 17:04


RT Screen per Nursing Assess ONCE 


   Comment: Protocol Order


   Physician Instructions: Greater than 3 points order RT Admission Screen


   Reason For Exam: Triggered on Admission


   Diagnosis: COPD


   Diagnosis: COPD


   Pneumonia: Yes


   Home O2: Yes


   Asthma: Yes


   CHF: No


   Home CPAP/BIPAP: No


   Home Nebs/MDI: Yes


   Total Points: 17


Smoking Cessation Education ONCE 


   Comment: 


   Diagnosis: COPD


   Smoking Status: Current every day smoker


   How long have you smoked: 63


   Have you smoked in the past 12 months: Yes


   Approximately how many cigarettes per day: 5


   Do you dip or chew tobacco: No


   If,Former Smoker,when did you quit: quit yesterday





05/20/20 07:00


Respiratory MDI DAILY 


   Comment: ANORO DAILY


   Diagnosis: COPD














Discharge Exam


General Appearance: no apparent distress


Neurologic Exam: alert, oriented x 3, cooperative, normal mood/affect, nml 

cerebellar function, nml station & gait


Eye Exam: eyes nml inspection


Ears, Nose, Throat Exam: normal ENT inspection, moist mucous membranes


Neck Exam: normal inspection


Respiratory Exam: wheezing (improved movement of air,has scattered eew)


Cardiovascular Exam: regular rate/rhythm


Gastrointestinal/Abdomen Exam: soft (nontender)


Male Genitalia Exam: deferred


Rectal Exam: deferred


Back Exam: muscle spasm


Extremity Exam: normal inspection, other (no edema)


Skin Exam: normal color, warm, dry





Final Diagnosis/Problem List





- Final Discharge Diagnosis/Problem


(1) COPD exacerbation


Current Visit: Yes   Status: Acute   


Assessment & Plan: 


continue home meds and O2 at 4L and follow with Pulmonologist


Code(s): J44.1 - CHRONIC OBSTRUCTIVE PULMONARY DISEASE W (ACUTE) EXACERBATION   





(2) Elevated d-dimer


Current Visit: Yes   Status: Acute   


Assessment & Plan: 


Negative for PE or DVT


Code(s): R79.89 - OTHER SPECIFIED ABNORMAL FINDINGS OF BLOOD CHEMISTRY   





(3) Lung cancer


Current Visit: No   Status: Acute   


Assessment & Plan: 


continue to follow with Oncologist


Code(s): C34.90 - MALIGNANT NEOPLASM OF UNSP PART OF UNSP BRONCHUS OR LUNG   





(4) Anxiety


Current Visit: Yes   Status: Acute   


Assessment & Plan: 


continue Ativan


Code(s): F41.9 - ANXIETY DISORDER, UNSPECIFIED   





(5) Hyponatremia


Current Visit: Yes   Status: Chronic   


Assessment & Plan: 


follow with PCP,may increase dietary sodium


Code(s): E87.1 - HYPO-OSMOLALITY AND HYPONATREMIA   





- Discharge


Disposition: Home, Self-Care


Condition: Stable


Prescriptions: 


New


   Albuterol/Ipratropium 3ml Neb* [DUONEB 0.5-3 MG/3 ml Neb**] 3 ml IH TIDRT  

ampul.neb


   Heparin Flush 500 units/5 ml** [Heparin Lock Flush 100 Units/ml 5ml Syringe**

] 500 units PORT FLUSH PRN PRN  disp.syrin


     PRN Reason: Iv Port Flush


   Albuterol 2.5 mg/3 ml Neb*** [Proventil 2.5 mg/3 ml Neb***] 2.5 mg IH Q2H 

PRN PRN  neb


     PRN Reason: Shortness Of Breath/Wheezing





Continue


   Sertraline HCl [Zoloft] 100 mg PO HS


   Lorazepam 1 mg*** [Ativan 1 MG***] 1 mg PO DAILY


   Doxepin HCl 10 mg PO HS


   Carbamazepine [Carbamazepine ER] 400 mg PO BID


   Tapentadol HCl [Nucynta] 100 mg PO Q4-6HPRN PRN


     PRN Reason: Pain


   Magnesium Oxide 400 mg*** [Mag-Ox 400***] 400 mg PO Q8H


   Pramipexole Di-HCl [Mirapex] 1 mg PO BID


   Albuterol 8 gm Mdi Hfa*** [Ventolin Hfa MDI***] 8 gm IH Q4H #2 hfa.aer.ad


   Promethazine HCl 25 mg*** [Phenergan 25 mg***] 25 mg PO Q12H PRN


     PRN Reason: Nausea


   Loratadine 10 mg*** [Claritin 10 mg***] 10 mg PO DAILY


   Ferrous Sulfate 325 mg*** [Feosol 325 mg***] 325 mg PO BID





Changed


   Prednisone 10 mg*** [Deltasone 10 mg***] 10 mg PO TID 30 Days #60 tab





Discontinued


   Amoxicillin/Potassium Clav [Augmentin 500-125 Tablet] 500 mg PO TID 10 Days #

30 tablet


Instructions:  Pneumonia, Adult (DC), COPD Diet


Follow up with: 


NANCY HUFF MD [Primary Care Provider] - 05/28/20 2:30 pm


Forms:  Discharge Instructions